# Patient Record
Sex: MALE | Race: WHITE | NOT HISPANIC OR LATINO | Employment: OTHER | URBAN - METROPOLITAN AREA
[De-identification: names, ages, dates, MRNs, and addresses within clinical notes are randomized per-mention and may not be internally consistent; named-entity substitution may affect disease eponyms.]

---

## 2022-08-12 ENCOUNTER — HOSPITAL ENCOUNTER (OUTPATIENT)
Dept: GENERAL RADIOLOGY | Facility: HOSPITAL | Age: 47
Discharge: HOME | End: 2022-08-12
Admitting: PHYSICIAN ASSISTANT

## 2022-08-12 ENCOUNTER — TELEPHONE (OUTPATIENT)
Dept: INTERNAL MEDICINE | Facility: CLINIC | Age: 47
End: 2022-08-12

## 2022-08-12 ENCOUNTER — OFFICE VISIT (OUTPATIENT)
Dept: INTERNAL MEDICINE | Facility: CLINIC | Age: 47
End: 2022-08-12

## 2022-08-12 VITALS
WEIGHT: 280 LBS | DIASTOLIC BLOOD PRESSURE: 76 MMHG | RESPIRATION RATE: 20 BRPM | HEIGHT: 72 IN | SYSTOLIC BLOOD PRESSURE: 116 MMHG | OXYGEN SATURATION: 98 % | HEART RATE: 74 BPM | TEMPERATURE: 97.1 F | BODY MASS INDEX: 37.93 KG/M2

## 2022-08-12 DIAGNOSIS — G89.29 CHRONIC BILATERAL LOW BACK PAIN WITH LEFT-SIDED SCIATICA: ICD-10-CM

## 2022-08-12 DIAGNOSIS — I10 PRIMARY HYPERTENSION: ICD-10-CM

## 2022-08-12 DIAGNOSIS — K21.9 GERD WITHOUT ESOPHAGITIS: ICD-10-CM

## 2022-08-12 DIAGNOSIS — F41.9 ANXIETY: ICD-10-CM

## 2022-08-12 DIAGNOSIS — G47.00 INSOMNIA, UNSPECIFIED TYPE: ICD-10-CM

## 2022-08-12 DIAGNOSIS — Z79.899 HIGH RISK MEDICATION USE: ICD-10-CM

## 2022-08-12 DIAGNOSIS — J30.9 ALLERGIC RHINITIS, UNSPECIFIED SEASONALITY, UNSPECIFIED TRIGGER: ICD-10-CM

## 2022-08-12 DIAGNOSIS — R07.81 RIB PAIN ON LEFT SIDE: Primary | ICD-10-CM

## 2022-08-12 DIAGNOSIS — R07.81 RIB PAIN ON LEFT SIDE: ICD-10-CM

## 2022-08-12 DIAGNOSIS — M54.42 CHRONIC BILATERAL LOW BACK PAIN WITH LEFT-SIDED SCIATICA: ICD-10-CM

## 2022-08-12 DIAGNOSIS — F32.A DEPRESSION, UNSPECIFIED DEPRESSION TYPE: ICD-10-CM

## 2022-08-12 PROCEDURE — 99204 OFFICE O/P NEW MOD 45 MIN: CPT | Performed by: PHYSICIAN ASSISTANT

## 2022-08-12 PROCEDURE — 71046 X-RAY EXAM CHEST 2 VIEWS: CPT

## 2022-08-12 RX ORDER — TRAZODONE HYDROCHLORIDE 100 MG/1
100 TABLET ORAL NIGHTLY
Status: CANCELLED | OUTPATIENT
Start: 2022-08-12

## 2022-08-12 RX ORDER — LEVOCETIRIZINE DIHYDROCHLORIDE 5 MG/1
5 TABLET, FILM COATED ORAL EVERY EVENING
Status: CANCELLED | OUTPATIENT
Start: 2022-08-12

## 2022-08-12 RX ORDER — QUETIAPINE FUMARATE 100 MG/1
100 TABLET, FILM COATED ORAL NIGHTLY
Qty: 90 TABLET | Refills: 0 | Status: SHIPPED | OUTPATIENT
Start: 2022-08-12

## 2022-08-12 RX ORDER — PHENOL 1.4 %
10 AEROSOL, SPRAY (ML) MUCOUS MEMBRANE NIGHTLY
Qty: 90 TABLET | Refills: 0 | Status: SHIPPED | OUTPATIENT
Start: 2022-08-12

## 2022-08-12 RX ORDER — PHENOL 1.4 %
10 AEROSOL, SPRAY (ML) MUCOUS MEMBRANE
COMMUNITY
End: 2022-08-12 | Stop reason: SDUPTHER

## 2022-08-12 RX ORDER — LISINOPRIL AND HYDROCHLOROTHIAZIDE 25; 20 MG/1; MG/1
1 TABLET ORAL DAILY
COMMUNITY
End: 2022-08-12 | Stop reason: SDUPTHER

## 2022-08-12 RX ORDER — CLONAZEPAM 2 MG/1
2 TABLET ORAL 2 TIMES DAILY PRN
Status: CANCELLED | OUTPATIENT
Start: 2022-08-12

## 2022-08-12 RX ORDER — PANTOPRAZOLE SODIUM 40 MG/1
40 TABLET, DELAYED RELEASE ORAL DAILY
Qty: 90 TABLET | Refills: 0 | Status: SHIPPED | OUTPATIENT
Start: 2022-08-12

## 2022-08-12 RX ORDER — CLONAZEPAM 2 MG/1
2 TABLET ORAL 2 TIMES DAILY PRN
COMMUNITY
End: 2022-09-15 | Stop reason: SDUPTHER

## 2022-08-12 RX ORDER — NAPROXEN SODIUM 550 MG/1
550 TABLET ORAL 2 TIMES DAILY WITH MEALS
COMMUNITY

## 2022-08-12 RX ORDER — MONTELUKAST SODIUM 10 MG/1
10 TABLET ORAL NIGHTLY
Qty: 90 TABLET | Refills: 0 | Status: SHIPPED | OUTPATIENT
Start: 2022-08-12

## 2022-08-12 RX ORDER — METHOCARBAMOL 500 MG/1
500 TABLET, FILM COATED ORAL 4 TIMES DAILY
Status: CANCELLED | OUTPATIENT
Start: 2022-08-12

## 2022-08-12 RX ORDER — MONTELUKAST SODIUM 10 MG/1
10 TABLET ORAL NIGHTLY
Status: CANCELLED | OUTPATIENT
Start: 2022-08-12

## 2022-08-12 RX ORDER — MONTELUKAST SODIUM 10 MG/1
10 TABLET ORAL NIGHTLY
COMMUNITY
End: 2022-08-12 | Stop reason: SDUPTHER

## 2022-08-12 RX ORDER — PHENOL 1.4 %
10 AEROSOL, SPRAY (ML) MUCOUS MEMBRANE
Status: CANCELLED | OUTPATIENT
Start: 2022-08-12

## 2022-08-12 RX ORDER — PANTOPRAZOLE SODIUM 40 MG/1
40 TABLET, DELAYED RELEASE ORAL DAILY
Status: CANCELLED | OUTPATIENT
Start: 2022-08-12

## 2022-08-12 RX ORDER — TRAZODONE HYDROCHLORIDE 100 MG/1
50-100 TABLET ORAL NIGHTLY PRN
Qty: 30 TABLET | Refills: 0 | Status: SHIPPED | OUTPATIENT
Start: 2022-08-12 | End: 2022-09-18 | Stop reason: DRUGHIGH

## 2022-08-12 RX ORDER — LISINOPRIL AND HYDROCHLOROTHIAZIDE 25; 20 MG/1; MG/1
1 TABLET ORAL DAILY
Qty: 90 TABLET | Refills: 0 | Status: SHIPPED | OUTPATIENT
Start: 2022-08-12

## 2022-08-12 RX ORDER — LEVOCETIRIZINE DIHYDROCHLORIDE 5 MG/1
5 TABLET, FILM COATED ORAL EVERY EVENING
Qty: 90 TABLET | Refills: 0 | Status: SHIPPED | OUTPATIENT
Start: 2022-08-12

## 2022-08-12 RX ORDER — NAPROXEN SODIUM 550 MG/1
550 TABLET ORAL 2 TIMES DAILY WITH MEALS
Status: CANCELLED | OUTPATIENT
Start: 2022-08-12

## 2022-08-12 RX ORDER — PANTOPRAZOLE SODIUM 40 MG/1
40 TABLET, DELAYED RELEASE ORAL DAILY
COMMUNITY
End: 2022-08-12 | Stop reason: SDUPTHER

## 2022-08-12 RX ORDER — LEVOCETIRIZINE DIHYDROCHLORIDE 5 MG/1
5 TABLET, FILM COATED ORAL EVERY EVENING
COMMUNITY
End: 2022-08-12 | Stop reason: SDUPTHER

## 2022-08-12 RX ORDER — LISINOPRIL AND HYDROCHLOROTHIAZIDE 25; 20 MG/1; MG/1
1 TABLET ORAL DAILY
Status: CANCELLED | OUTPATIENT
Start: 2022-08-12

## 2022-08-12 RX ORDER — QUETIAPINE FUMARATE 100 MG/1
100 TABLET, FILM COATED ORAL
Status: CANCELLED | OUTPATIENT
Start: 2022-08-12

## 2022-08-12 RX ORDER — METHOCARBAMOL 500 MG/1
500 TABLET, FILM COATED ORAL 4 TIMES DAILY
COMMUNITY

## 2022-08-12 RX ORDER — TRAZODONE HYDROCHLORIDE 100 MG/1
100 TABLET ORAL NIGHTLY
COMMUNITY
End: 2022-08-12 | Stop reason: SDUPTHER

## 2022-08-12 NOTE — PROGRESS NOTES
Chief Complaint  Back Pain and Rib Pain    Subjective          History of Present Illness  Nik Watkins presents to [facility] to establish care.  Previous PCP passed away so he is here to establish care.     Rib Pain:  Feels pain on the left side of his rib cage that started after he sneezed the other day. Has not had trouble breathing with it. He has difficulty with using core muscles d/t the pain. Would like xray.    Back Pain:  Has had low back pain for the last 15-20 years since a heavy stove fell on him at work. Previous MRI showed Lumbar herniated discs, disc desiccation, DDD. He has started having neck pains lately as well with cracking in his neck when he turns his head. Has burning in left calf and numbness in side of foot intermittently, has sciatica on the left side. He used to be on pain medications but has not been on this for a while, was on hydrocodone 10mg in the past. His back will go out sometimes. His last MRI was 2010. He has been on disability for back pain in 2014. He was hesitant to do another MRI because he was worried they would reopen his case and take away his disability. He did try injections in his back in the past but they did not help. He did PT in 2017, traction helped in the past. Has been told back surgery may eventually be needed but should not do it yet. Currently taking naproxen and robaxin prn for back pain. Does not want refill on narcotic pain med at this time.     Anxiety/Depression/Insomnia:  Has had worsening depression/anxiety lately since he is caring for his mother who is having mental decline and father who is going through cancer therapy. He has difficulty sleeping and is on Seroquel nightly for sleep and takes prn trazodone. He has tried Ativan and Zoloft and several others but they don't help. He has been on klonopin for the last several years, takes this prn only when he is having significant panic attack. Does not take it  every single day. He was seeing therapy at  (through oncology since he is the caregiver for his father) but wants to see a diff therapist.     HTN:  Well controlled on lisinopril/hctz 20-25. No CP, SOA, edema.     AR:  Well controlled on Singulair and xyzal.       Review of Systems   Constitutional: Negative for fever and unexpected weight loss.   Respiratory: Negative for cough, shortness of breath and wheezing.    Cardiovascular: Positive for chest pain (left rib). Negative for palpitations.   Musculoskeletal: Positive for back pain and myalgias.   Psychiatric/Behavioral: Positive for sleep disturbance, depressed mood and stress. Negative for suicidal ideas. The patient is nervous/anxious.        The following portions of the patient's history were reviewed and updated as appropriate: allergies, current medications, past family history, past medical history, past social history, past surgical history and problem list.    Allergies   Allergen Reactions   • Penicillin G Hives   • Medrol [Methylprednisolone] Rash     Facial flushing     Current Outpatient Medications on File Prior to Visit   Medication Sig Dispense Refill   • clonazePAM (KlonoPIN) 2 MG tablet Take 2 mg by mouth 2 (Two) Times a Day As Needed.     • methocarbamol (ROBAXIN) 500 MG tablet Take 500 mg by mouth 4 (Four) Times a Day. Take 1 tab two times a day.     • naproxen sodium (ANAPROX) 550 MG tablet Take 550 mg by mouth 2 (Two) Times a Day With Meals. 1 tab every 12 hours.       No current facility-administered medications on file prior to visit.     New Medications Ordered This Visit   Medications   • levocetirizine (XYZAL) 5 MG tablet     Sig: Take 1 tablet by mouth Every Evening. Take 1 tab daily.     Dispense:  90 tablet     Refill:  0   • Melatonin 10 MG tablet     Sig: Take 1 tablet by mouth Every Night. 2 gummies before sleep every night.     Dispense:  90 tablet     Refill:  0   • lisinopril-hydrochlorothiazide (PRINZIDE,ZESTORETIC) 20-25  "MG per tablet     Sig: Take 1 tablet by mouth Daily. Take 1 tablet per day.     Dispense:  90 tablet     Refill:  0   • pantoprazole (PROTONIX) 40 MG EC tablet     Sig: Take 1 tablet by mouth Daily. Take 1 tablet per day.     Dispense:  90 tablet     Refill:  0   • montelukast (SINGULAIR) 10 MG tablet     Sig: Take 1 tablet by mouth Every Night. 1 tablet per day.     Dispense:  90 tablet     Refill:  0   • QUEtiapine (SEROquel) 100 MG tablet     Sig: Take 1 tablet by mouth Every Night. 1 tab every night.     Dispense:  90 tablet     Refill:  0   • traZODone (DESYREL) 100 MG tablet     Sig: Take 0.5-1 tablets by mouth At Night As Needed for Sleep.     Dispense:  30 tablet     Refill:  0     Past Medical History:   Diagnosis Date   • Anxiety    • Depression    • Insomnia    • Migraine    • Obesity       Past Surgical History:   Procedure Laterality Date   • ADENOIDECTOMY     • EAR TUBES     • TONSILLECTOMY        Family History   Problem Relation Age of Onset   • Hypertension Mother    • Hyperlipidemia Mother    • Arthritis Mother    • Hypertension Father    • Diabetes Father    • Arthritis Father    • Cancer Father    • Lung cancer Father    • Neuropathy Father       Social History     Socioeconomic History   • Marital status: Single   Tobacco Use   • Smoking status: Former Smoker     Years: 25.00     Quit date: 10/1/2008     Years since quittin.8   • Smokeless tobacco: Never Used   Vaping Use   • Vaping Use: Former   Substance and Sexual Activity   • Alcohol use: Never   • Drug use: Never   • Sexual activity: Not Currently     Partners: Female     Birth control/protection: Condom        Objective   Vital Signs:   Vitals:    22 1422   BP: 116/76   Pulse: 74   Resp: 20   Temp: 97.1 °F (36.2 °C)   TempSrc: Temporal   SpO2: 98%   Weight: 127 kg (280 lb)   Height: 181.6 cm (71.5\")   PainSc: 0-No pain      Body mass index is 38.51 kg/m².  Physical Exam  Vitals reviewed.   Constitutional:       " General: He is not in acute distress.     Appearance: Normal appearance.   HENT:      Head: Normocephalic and atraumatic.   Eyes:      General: No scleral icterus.     Extraocular Movements: Extraocular movements intact.      Conjunctiva/sclera: Conjunctivae normal.   Cardiovascular:      Rate and Rhythm: Normal rate and regular rhythm.      Heart sounds: Normal heart sounds. No murmur heard.  Pulmonary:      Effort: Pulmonary effort is normal. No respiratory distress.      Breath sounds: Normal breath sounds. No stridor. No wheezing or rhonchi.   Musculoskeletal:         General: Normal range of motion.      Cervical back: Normal range of motion and neck supple.   Skin:     General: Skin is warm and dry.      Coloration: Skin is not jaundiced.   Neurological:      General: No focal deficit present.      Mental Status: He is alert and oriented to person, place, and time.      Gait: Gait normal.   Psychiatric:         Mood and Affect: Mood normal.         Behavior: Behavior normal.          Result Review :                   Assessment and Plan    Diagnoses and all orders for this visit:    1. Rib pain on left side (Primary)  Assessment & Plan:  Get xray on ribs    Orders:  -     XR Chest PA & Lateral; Future    2. Chronic bilateral low back pain with left-sided sciatica  Assessment & Plan:  Would prefer to wait on pain treatment referral and MRI at this time.  Continue naproxen and Robaxin      3. Allergic rhinitis, unspecified seasonality, unspecified trigger  Assessment & Plan:  Chronic, controlled, continue Singulair and Xyzal    Orders:  -     levocetirizine (XYZAL) 5 MG tablet; Take 1 tablet by mouth Every Evening. Take 1 tab daily.  Dispense: 90 tablet; Refill: 0  -     montelukast (SINGULAIR) 10 MG tablet; Take 1 tablet by mouth Every Night. 1 tablet per day.  Dispense: 90 tablet; Refill: 0    4. Primary hypertension  Assessment & Plan:  Hypertension is improving with treatment.  Continue current treatment  regimen.  Blood pressure will be reassessed in 3 months.    Orders:  -     lisinopril-hydrochlorothiazide (PRINZIDE,ZESTORETIC) 20-25 MG per tablet; Take 1 tablet by mouth Daily. Take 1 tablet per day.  Dispense: 90 tablet; Refill: 0    5. Anxiety  Assessment & Plan:  Chronic, uncontrolled, cont klonopin prn. Adv I do not write this medicine for daily use, will refer to psych for evaluation    Orders:  -     Ambulatory Referral to Psychiatry  -     Ambulatory Referral to Psychology  -     Compliance Drug Analysis, Ur - Urine, Clean Catch; Future    6. Depression, unspecified depression type  Assessment & Plan:  Chronic, uncontrolled, refer to psych    Orders:  -     Ambulatory Referral to Psychiatry  -     Ambulatory Referral to Psychology    7. Insomnia, unspecified type  Assessment & Plan:  Cont trazodone and melatonin prn and seroquel nightly.     Orders:  -     Ambulatory Referral to Psychiatry  -     Melatonin 10 MG tablet; Take 1 tablet by mouth Every Night. 2 gummies before sleep every night.  Dispense: 90 tablet; Refill: 0  -     QUEtiapine (SEROquel) 100 MG tablet; Take 1 tablet by mouth Every Night. 1 tab every night.  Dispense: 90 tablet; Refill: 0  -     traZODone (DESYREL) 100 MG tablet; Take 0.5-1 tablets by mouth At Night As Needed for Sleep.  Dispense: 30 tablet; Refill: 0  -     Ambulatory Referral to Psychology    8. GERD without esophagitis  -     pantoprazole (PROTONIX) 40 MG EC tablet; Take 1 tablet by mouth Daily. Take 1 tablet per day.  Dispense: 90 tablet; Refill: 0    9. High risk medication use  -     Compliance Drug Analysis, Ur - Urine, Clean Catch; Future          Follow Up   Return in 2 months (on 10/12/2022), or if symptoms worsen or fail to improve.    Follow up if symptoms worsen or persist or has new or concerning symptoms, go to ER for severe symptoms.   Reviewed common medication effects and side effects and to report side effects immediately, the patient expressed good  understanding.  Encouraged medication compliance and the importance of keeping scheduled follow up appointments with me and any other providers.  If a referral was made please contact our office if you have not heard about an appointment in the next 2 weeks.   If labs or images are ordered we will contact you with the results within the next week.  If you have not heard from us after a week please call our office to inquire about the results.   Patient was given instructions and counseling regarding his condition or for health maintenance advice. Please see specific information pulled into the AVS if appropriate.     Tori Trujillo PA-C    * Please note that portions of this note were completed with a voice recognition program.

## 2022-08-12 NOTE — TELEPHONE ENCOUNTER
Pharmacy called seeking clarifications on prescription for Melatonin 10 MG tablet. They need to know if the instructions are 1 tablet by mouth every night or 2 gummies before sleep every night. Please advise.

## 2022-08-12 NOTE — ASSESSMENT & PLAN NOTE
Would prefer to wait on pain treatment referral and MRI at this time.  Continue naproxen and Robaxin

## 2022-08-15 ENCOUNTER — TELEPHONE (OUTPATIENT)
Dept: INTERNAL MEDICINE | Facility: CLINIC | Age: 47
End: 2022-08-15

## 2022-08-15 DIAGNOSIS — R91.1 NODULE OF UPPER LOBE OF RIGHT LUNG: Primary | ICD-10-CM

## 2022-08-15 DIAGNOSIS — R07.81 RIB PAIN ON LEFT SIDE: ICD-10-CM

## 2022-08-15 NOTE — TELEPHONE ENCOUNTER
----- Message from Tori Trujillo PA-C sent at 8/15/2022  8:55 AM EDT -----  Your chest xray did not show any abnormality on the left side to explain your rib pain however it did show a nodule in your right upper lung that they wanted a better look at. I am going to order a CT of your lungs, this will also give us a better image of the left ribs. Please let me know if you don't get a call to schedule the CT in the next week.

## 2022-08-15 NOTE — TELEPHONE ENCOUNTER
Pt informed. Verbalized good understanding and appreciation. He states got a call on Friday but is not sure what they were calling for. I gave him central scheduling # to schedule appt. He verbalized good understanding.

## 2022-08-17 ENCOUNTER — TELEPHONE (OUTPATIENT)
Dept: INTERNAL MEDICINE | Facility: CLINIC | Age: 47
End: 2022-08-17

## 2022-08-17 PROBLEM — K21.9 GERD WITHOUT ESOPHAGITIS: Status: ACTIVE | Noted: 2022-08-17

## 2022-08-17 NOTE — TELEPHONE ENCOUNTER
PT CALLED STATING HE HAD A MISSED CALL REGARDING A REFERRAL. I WASN'T SURE IF IT WAS FOR BEHAVORIAL HEALTH OR ONE OF THE IMAGING REFERRALS.    PLEASE ADVISE.

## 2022-08-17 NOTE — ASSESSMENT & PLAN NOTE
Chronic, uncontrolled, cont klonopin prn. Adv I do not write this medicine for daily use, will refer to psych for evaluation

## 2022-08-24 ENCOUNTER — HOSPITAL ENCOUNTER (OUTPATIENT)
Dept: CT IMAGING | Facility: HOSPITAL | Age: 47
Discharge: HOME | End: 2022-08-24
Admitting: PHYSICIAN ASSISTANT

## 2022-08-24 DIAGNOSIS — R07.81 RIB PAIN ON LEFT SIDE: ICD-10-CM

## 2022-08-24 DIAGNOSIS — R91.1 NODULE OF UPPER LOBE OF RIGHT LUNG: ICD-10-CM

## 2022-08-24 PROCEDURE — 71250 CT THORAX DX C-: CPT

## 2022-08-25 ENCOUNTER — TELEPHONE (OUTPATIENT)
Dept: INTERNAL MEDICINE | Facility: CLINIC | Age: 47
End: 2022-08-25

## 2022-08-25 NOTE — TELEPHONE ENCOUNTER
----- Message from Tori Trujillo PA-C sent at 8/24/2022  7:20 PM EDT -----  The lung nodule looks benign. It is likely just there from a previous infection. No areas of concern.

## 2022-09-14 ENCOUNTER — OFFICE VISIT (OUTPATIENT)
Dept: BEHAVIORAL HEALTH | Facility: CLINIC | Age: 47
End: 2022-09-14

## 2022-09-14 VITALS
SYSTOLIC BLOOD PRESSURE: 122 MMHG | HEIGHT: 72 IN | BODY MASS INDEX: 37.65 KG/M2 | WEIGHT: 278 LBS | DIASTOLIC BLOOD PRESSURE: 70 MMHG | HEART RATE: 90 BPM

## 2022-09-14 DIAGNOSIS — F41.1 GENERALIZED ANXIETY DISORDER: ICD-10-CM

## 2022-09-14 DIAGNOSIS — G47.00 INSOMNIA, UNSPECIFIED TYPE: ICD-10-CM

## 2022-09-14 DIAGNOSIS — F41.0 PANIC DISORDER: Primary | ICD-10-CM

## 2022-09-14 PROBLEM — F32.A DEPRESSION: Status: RESOLVED | Noted: 2022-08-12 | Resolved: 2022-09-14

## 2022-09-14 PROBLEM — F41.9 ANXIETY: Status: RESOLVED | Noted: 2022-08-12 | Resolved: 2022-09-14

## 2022-09-14 PROCEDURE — 90792 PSYCH DIAG EVAL W/MED SRVCS: CPT

## 2022-09-14 RX ORDER — POLYMYXIN B SULFATE AND TRIMETHOPRIM 1; 10000 MG/ML; [USP'U]/ML
SOLUTION OPHTHALMIC
COMMUNITY
Start: 2022-09-10 | End: 2022-09-18

## 2022-09-14 RX ORDER — HYDROXYZINE PAMOATE 25 MG/1
25-50 CAPSULE ORAL 3 TIMES DAILY PRN
Qty: 60 CAPSULE | Refills: 1 | Status: SHIPPED | OUTPATIENT
Start: 2022-09-14
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
## 2022-09-15 ENCOUNTER — OFFICE VISIT (OUTPATIENT)
Dept: INTERNAL MEDICINE | Facility: CLINIC | Age: 47
End: 2022-09-15

## 2022-09-15 VITALS
DIASTOLIC BLOOD PRESSURE: 64 MMHG | WEIGHT: 280 LBS | OXYGEN SATURATION: 98 % | SYSTOLIC BLOOD PRESSURE: 130 MMHG | HEART RATE: 72 BPM | RESPIRATION RATE: 20 BRPM | HEIGHT: 72 IN | BODY MASS INDEX: 37.93 KG/M2 | TEMPERATURE: 96.8 F

## 2022-09-15 DIAGNOSIS — G89.29 CHRONIC BILATERAL LOW BACK PAIN WITH LEFT-SIDED SCIATICA: ICD-10-CM

## 2022-09-15 DIAGNOSIS — M54.42 CHRONIC BILATERAL LOW BACK PAIN WITH LEFT-SIDED SCIATICA: ICD-10-CM

## 2022-09-15 DIAGNOSIS — Z12.11 SCREEN FOR COLON CANCER: ICD-10-CM

## 2022-09-15 DIAGNOSIS — F41.1 GENERALIZED ANXIETY DISORDER: Primary | ICD-10-CM

## 2022-09-15 DIAGNOSIS — F41.0 PANIC DISORDER: ICD-10-CM

## 2022-09-15 DIAGNOSIS — Z79.899 HIGH RISK MEDICATION USE: ICD-10-CM

## 2022-09-15 DIAGNOSIS — I10 PRIMARY HYPERTENSION: ICD-10-CM

## 2022-09-15 DIAGNOSIS — G47.00 INSOMNIA, UNSPECIFIED TYPE: ICD-10-CM

## 2022-09-15 PROCEDURE — 99214 OFFICE O/P EST MOD 30 MIN: CPT | Performed by: PHYSICIAN ASSISTANT

## 2022-09-15 RX ORDER — TRAZODONE HYDROCHLORIDE 50 MG/1
50 TABLET ORAL NIGHTLY
Qty: 90 TABLET | Refills: 0 | Status: SHIPPED | OUTPATIENT
Start: 2022-09-15

## 2022-09-15 NOTE — PROGRESS NOTES
"Chief Complaint  Anxiety and Depression    Subjective          History of Present Illness  Nik Watkins presents to [facility] for   History of Present Illness  Back Pain:  Has had low back pain for the last 15-20 years since a heavy stove fell on him at work. Previous MRI showed Lumbar herniated discs, disc desiccation, DDD. He has started having neck pains lately as well with cracking in his neck when he turns his head. Has burning in left calf and numbness in side of foot intermittently, has sciatica on the left side. He used to be on pain medications but has not been on this for a while, was on hydrocodone 10mg in the past. His back will \"go out\" sometimes. His last MRI was 2010. He has been on disability for back pain in 2014. He was hesitant to do another MRI because he was worried they would reopen his case and take away his disability. He did try injections in his back in the past but they did not help. He did PT in 2017, traction helped in the past. Has been told back surgery may eventually be needed but should not do it yet. Currently taking naproxen and robaxin prn for back pain. Does not want refill on narcotic pain med at this time.     Anxiety/Depression/Insomnia:  Did recently see psych at  and was started on hydroxyzine prn for anx. Wants to try smaller dose of trazodone b/c it makes him sleepy the next day.  Has had worsening depression/anxiety lately since he is caring for his mother who is having mental decline and father who is going through cancer therapy. He has difficulty sleeping and is on Seroquel nightly for sleep and takes prn trazodone. He has tried Ativan and Zoloft and several others but they don't help. He has been on klonopin for the last several years, takes this prn only when he is having significant panic attack. Does not take it every day.     HTN:  Well controlled on lisinopril/hctz 20-25. No CP, SOA, edema.     AR:  Well controlled on " Singulair and xyzal.     Had recent labs on 5/2022. Does not want to repeat labs today.      Review of Systems   Constitutional: Negative for fever and unexpected weight loss.   Respiratory: Negative for cough, shortness of breath and wheezing.    Cardiovascular: Negative for chest pain and palpitations.   Musculoskeletal: Positive for back pain.   Psychiatric/Behavioral: Negative for suicidal ideas. The patient is nervous/anxious.        The following portions of the patient's history were reviewed and updated as appropriate: allergies, current medications, past family history, past medical history, past social history, past surgical history and problem list.  Allergies   Allergen Reactions   • Penicillin G Hives   • Medrol [Methylprednisolone] Rash     Facial flushing     Current Outpatient Medications on File Prior to Visit   Medication Sig Dispense Refill   • levocetirizine (XYZAL) 5 MG tablet Take 1 tablet by mouth Every Evening. Take 1 tab daily. 90 tablet 0   • lisinopril-hydrochlorothiazide (PRINZIDE,ZESTORETIC) 20-25 MG per tablet Take 1 tablet by mouth Daily. Take 1 tablet per day. 90 tablet 0   • Melatonin 10 MG tablet Take 1 tablet by mouth Every Night. 2 gummies before sleep every night. 90 tablet 0   • methocarbamol (ROBAXIN) 500 MG tablet Take 500 mg by mouth 4 (Four) Times a Day. Take 1 tab two times a day.     • montelukast (SINGULAIR) 10 MG tablet Take 1 tablet by mouth Every Night. 1 tablet per day. 90 tablet 0   • pantoprazole (PROTONIX) 40 MG EC tablet Take 1 tablet by mouth Daily. Take 1 tablet per day. 90 tablet 0   • QUEtiapine (SEROquel) 100 MG tablet Take 1 tablet by mouth Every Night. 1 tab every night. 90 tablet 0   • [DISCONTINUED] traZODone (DESYREL) 100 MG tablet Take 0.5-1 tablets by mouth At Night As Needed for Sleep. 30 tablet 0   • hydrOXYzine pamoate (Vistaril) 25 MG capsule Take 1-2 capsules by mouth 3 (Three) Times a Day As Needed for Anxiety. 60 capsule 1   • naproxen sodium  "(ANAPROX) 550 MG tablet Take 550 mg by mouth 2 (Two) Times a Day With Meals. 1 tab every 12 hours.     • trimethoprim-polymyxin b (POLYTRIM) 39282-1.1 UNIT/ML-% ophthalmic solution        No current facility-administered medications on file prior to visit.     New Medications Ordered This Visit   Medications   • traZODone (DESYREL) 50 MG tablet     Sig: Take 1 tablet by mouth Every Night.     Dispense:  90 tablet     Refill:  0   • clonazePAM (KlonoPIN) 2 MG tablet     Sig: Take 1 tablet by mouth 2 (Two) Times a Day As Needed for Anxiety.     Dispense:  10 tablet     Refill:  0     Social History     Tobacco Use   Smoking Status Former Smoker   • Years: 25.00   • Quit date: 10/1/2008   • Years since quittin.9   Smokeless Tobacco Never Used        Objective   Vital Signs:   Vitals:    09/15/22 1340   BP: 130/64   Pulse: 72   Resp: 20   Temp: 96.8 °F (36 °C)   TempSrc: Temporal   SpO2: 98%   Weight: 127 kg (280 lb)   Height: 181.6 cm (71.5\")   PainSc: 0-No pain      Physical Exam  Vitals reviewed.   Constitutional:       General: He is not in acute distress.     Appearance: Normal appearance.   HENT:      Head: Normocephalic and atraumatic.   Eyes:      General: No scleral icterus.     Extraocular Movements: Extraocular movements intact.      Conjunctiva/sclera: Conjunctivae normal.   Cardiovascular:      Rate and Rhythm: Normal rate and regular rhythm.      Heart sounds: Normal heart sounds. No murmur heard.  Pulmonary:      Effort: Pulmonary effort is normal. No respiratory distress.      Breath sounds: Normal breath sounds. No stridor. No wheezing or rhonchi.   Musculoskeletal:      Cervical back: Normal range of motion and neck supple.   Skin:     General: Skin is warm and dry.      Coloration: Skin is not jaundiced.   Neurological:      General: No focal deficit present.      Mental Status: He is alert and oriented to person, place, and time.      Gait: Gait normal.   Psychiatric:         Mood and Affect: " Mood normal.         Behavior: Behavior normal.        No LMP for male patient.    Result Review :                   Assessment and Plan    Diagnoses and all orders for this visit:    1. Generalized anxiety disorder (Primary)  Assessment & Plan:  Chronic, stable on current meds, did see psych but would like to f/u with me for anx med, would like to see therapist. Cont atarax prn for anxiety and can use klonopin prn for severe anxiety.   UDS, CSA 9/15/22, Raciel    Orders:  -     Compliance Drug Analysis, Ur - Urine, Clean Catch  -     clonazePAM (KlonoPIN) 2 MG tablet; Take 1 tablet by mouth 2 (Two) Times a Day As Needed for Anxiety.  Dispense: 10 tablet; Refill: 0    2. Panic disorder  -     Compliance Drug Analysis, Ur - Urine, Clean Catch  -     clonazePAM (KlonoPIN) 2 MG tablet; Take 1 tablet by mouth 2 (Two) Times a Day As Needed for Anxiety.  Dispense: 10 tablet; Refill: 0    3. High risk medication use  -     Compliance Drug Analysis, Ur - Urine, Clean Catch    4. Screen for colon cancer    5. Insomnia, unspecified type  Assessment & Plan:  Cont seroquel, rx lower dose trazodone to help with morning sleepiness.     Orders:  -     traZODone (DESYREL) 50 MG tablet; Take 1 tablet by mouth Every Night.  Dispense: 90 tablet; Refill: 0    6. Primary hypertension  Assessment & Plan:  Chronic, stable, continue current meds      7. Chronic bilateral low back pain with left-sided sciatica  Assessment & Plan:  Cont naproxen and robaxin, pt wishes to wait on PTC referral and MRI at this time        Follow Up   Return in about 2 months (around 11/15/2022) for Chronic Conditions, anxiety.    Follow up if symptoms worsen or persist or has new or concerning symptoms, go to ER for severe symptoms.   Reviewed common medication effects and side effects and advised to report side effects immediately.  Encouraged medication compliance and the importance of keeping scheduled follow up appointments with me and any other  providers.  If a referral was made please contact our office if you have not heard about an appointment in the next 2 weeks.   If labs or images are ordered we will contact you with the results within the next week.  If you have not heard from us after a week please call our office to inquire about the results.   Patient was given instructions and counseling regarding his condition or for health maintenance advice. Please see specific information pulled into the AVS if appropriate.     Tori Trujillo PA-C    * Please note that portions of this note were completed with a voice recognition program.

## 2022-09-18 RX ORDER — CLONAZEPAM 2 MG/1
2 TABLET ORAL 2 TIMES DAILY PRN
Qty: 10 TABLET | Refills: 0 | Status: SHIPPED | OUTPATIENT
Start: 2022-09-18

## 2022-09-18 NOTE — ASSESSMENT & PLAN NOTE
Chronic, stable on current meds, did see psych but would like to f/u with me for anx med, would like to see therapist. Cont atarax prn for anxiety and can use klonopin prn for severe anxiety.   UDS, CSA 9/15/22, Raciel

## 2022-09-23 LAB
DRUGS UR: NORMAL
WRITTEN AUTHORIZATION: NORMAL